# Patient Record
Sex: FEMALE | Race: WHITE | NOT HISPANIC OR LATINO | Employment: OTHER | URBAN - METROPOLITAN AREA
[De-identification: names, ages, dates, MRNs, and addresses within clinical notes are randomized per-mention and may not be internally consistent; named-entity substitution may affect disease eponyms.]

---

## 2020-08-25 ENCOUNTER — OFFICE VISIT (OUTPATIENT)
Dept: URGENT CARE | Facility: MEDICAL CENTER | Age: 40
End: 2020-08-25
Payer: COMMERCIAL

## 2020-08-25 VITALS
HEART RATE: 76 BPM | OXYGEN SATURATION: 100 % | DIASTOLIC BLOOD PRESSURE: 84 MMHG | BODY MASS INDEX: 23.6 KG/M2 | WEIGHT: 125 LBS | TEMPERATURE: 100 F | RESPIRATION RATE: 18 BRPM | HEIGHT: 61 IN | SYSTOLIC BLOOD PRESSURE: 161 MMHG

## 2020-08-25 DIAGNOSIS — S61.215A LACERATION OF LEFT RING FINGER WITHOUT FOREIGN BODY WITHOUT DAMAGE TO NAIL, INITIAL ENCOUNTER: Primary | ICD-10-CM

## 2020-08-25 PROCEDURE — G0382 LEV 3 HOSP TYPE B ED VISIT: HCPCS | Performed by: PHYSICIAN ASSISTANT

## 2020-08-26 ENCOUNTER — OFFICE VISIT (OUTPATIENT)
Dept: URGENT CARE | Facility: MEDICAL CENTER | Age: 40
End: 2020-08-26
Payer: COMMERCIAL

## 2020-08-26 VITALS
BODY MASS INDEX: 23.6 KG/M2 | DIASTOLIC BLOOD PRESSURE: 79 MMHG | HEIGHT: 61 IN | HEART RATE: 63 BPM | WEIGHT: 125 LBS | OXYGEN SATURATION: 100 % | TEMPERATURE: 98.7 F | SYSTOLIC BLOOD PRESSURE: 161 MMHG | RESPIRATION RATE: 18 BRPM

## 2020-08-26 DIAGNOSIS — S61.209D AVULSION OF SKIN OF FINGER, SUBSEQUENT ENCOUNTER: Primary | ICD-10-CM

## 2020-08-26 PROCEDURE — G0382 LEV 3 HOSP TYPE B ED VISIT: HCPCS | Performed by: PHYSICIAN ASSISTANT

## 2020-08-26 NOTE — PROGRESS NOTES
[facility]        NAME: Olive Macias is a 47 y o  female  : 1980    MRN: [id number]  DATE: 2020  TIME: 9:42 PM    Assessment and Plan   Laceration of left ring finger without foreign body without damage to nail, initial encounter [S61 215A]  1  Laceration of left ring finger without foreign body without damage to nail, initial encounter       Wound unable to be sutured as she has cut off some of her finger pad  1 mL of lidocaine with epinephrine injected to help stop bleeding and surgical foam applied with bleeding being stopped  She will return in 24 hours for surgical foam removal   Recommended Tylenol or Motrin for pain and keeping dressing applied for 24 hours  Patient declined tetanus vaccination in office  Patient Instructions   Tylenol or Motrin for pain  Keep dressing applied and do not get wet  Return in 24 hours for surgical foam removal  Follow up with PCP in 3-5 days  Proceed to ER if symptoms worsen  Chief Complaint     Chief Complaint   Patient presents with    Finger Laceration     Right 4th digit laceration tonight  Pt declines Tdap  History of Present Illness       Patient is a 31-year-old female who presents today with right 4th digit laceration  Patient states she accidentally lacerated her finger with a mandolin slicer tonight  Is not up-to-date on tetanus but declines vaccinations and states she does not vaccinate  Denies any numbness or tingling  Review of Systems   Review of Systems   Constitutional: Negative for fever  Respiratory: Negative for shortness of breath  Cardiovascular: Negative for chest pain  Skin: Positive for wound  Current Medications     No current outpatient medications on file      Current Allergies     Allergies as of 2020 - Reviewed 2020   Allergen Reaction Noted    Sulfa antibiotics Hives 2015            The following portions of the patient's history were reviewed and updated as appropriate: allergies, current medications, past family history, past medical history, past social history, past surgical history and problem list      History reviewed  No pertinent past medical history  History reviewed  No pertinent surgical history  Family History   Problem Relation Age of Onset    Hypertension Mother     Depression Mother     Hypertension Father          Medications have been verified  Objective   /84   Pulse 76   Temp 100 °F (37 8 °C)   Resp 18   Ht 5' 1" (1 549 m)   Wt 56 7 kg (125 lb)   SpO2 100%   BMI 23 62 kg/m²        Physical Exam     Physical Exam  Constitutional:       Appearance: Normal appearance  Cardiovascular:      Rate and Rhythm: Normal rate and regular rhythm  Pulmonary:      Effort: Pulmonary effort is normal       Breath sounds: Normal breath sounds  Skin:     General: Skin is warm and dry  Findings: Wound present  Neurological:      Mental Status: She is alert

## 2020-08-27 ENCOUNTER — APPOINTMENT (OUTPATIENT)
Dept: RADIOLOGY | Facility: AMBULARY SURGERY CENTER | Age: 40
End: 2020-08-27
Attending: SURGERY
Payer: COMMERCIAL

## 2020-08-27 ENCOUNTER — OFFICE VISIT (OUTPATIENT)
Dept: OBGYN CLINIC | Facility: CLINIC | Age: 40
End: 2020-08-27
Payer: COMMERCIAL

## 2020-08-27 VITALS
WEIGHT: 127 LBS | HEART RATE: 56 BPM | SYSTOLIC BLOOD PRESSURE: 132 MMHG | DIASTOLIC BLOOD PRESSURE: 85 MMHG | HEIGHT: 61 IN | BODY MASS INDEX: 23.98 KG/M2

## 2020-08-27 DIAGNOSIS — S61.214A LACERATION OF RIGHT RING FINGER, FOREIGN BODY PRESENCE UNSPECIFIED, NAIL DAMAGE STATUS UNSPECIFIED, INITIAL ENCOUNTER: ICD-10-CM

## 2020-08-27 DIAGNOSIS — S61.214A LACERATION OF RIGHT RING FINGER, FOREIGN BODY PRESENCE UNSPECIFIED, NAIL DAMAGE STATUS UNSPECIFIED, INITIAL ENCOUNTER: Primary | ICD-10-CM

## 2020-08-27 PROCEDURE — 99203 OFFICE O/P NEW LOW 30 MIN: CPT | Performed by: SURGERY

## 2020-08-27 PROCEDURE — 73130 X-RAY EXAM OF HAND: CPT

## 2020-08-27 NOTE — PATIENT INSTRUCTIONS
No direct water  Change outer dressing/bandage daily but leave surgi foam/Gelfoam in place  Call hand specialist and wound care for follow-up/recheck tomorrow  Go to the nearest ER for evaluation if any fevers, redness, warmth, discharge, streaking redness, redness that is circumferential, bleeding, pain, signs of infection or other concerning symptoms

## 2020-08-27 NOTE — PROGRESS NOTES
Astrid PRESTON  Attending, Orthopaedic Surgery  Hand, Wrist, and Elbow Surgery  Formerly [facility]      ORTHOPAEDIC HAND, WRIST, AND ELBOW OFFICE  VISIT       ASSESSMENT/PLAN:      72-year-old female with superficial right ring finger laceration, date of injury 08/25/2020  Dressings were removed today, wound is very superficial and bleeding is controlled  No significant damage to the nail  Will initiate a Xeroform and finger sock dressing for the next 3 days, transition to just dry dressings after that  She may shower and wash her hands as normal but avoid soaking the finger  Avoid any Neosporin or peroxide  Will hold off on antibiotics for now, patient was advised that if she notices any signs of infection to contact us as soon as possible  Leave wound open to air for several hours per day if possible  Follow-up in 1 week for repeat evaluation    The patient verbalized understanding of exam findings and treatment plan  We engaged in the shared decision-making process and treatment options were discussed at length with the patient  Surgical and conservative management discussed today along with risks and benefits  Diagnoses and all orders for this visit:    Laceration of right ring finger, foreign body presence unspecified, nail damage status unspecified, initial encounter  -     XR hand 3+ vw right; Future        Follow Up:  Return in about 1 week (around 9/3/2020) for Recheck  To Do Next Visit:  Re-evaluation of current issue      ____________________________________________________________________________________________________________________________________________      CHIEF COMPLAINT:  Chief Complaint   Patient presents with    Right Hand - Pain       SUBJECTIVE:  Marley Rivas is a 47y o  year old RHD female who presents for evaluation of a right ring finger injury that occurred on 08/25/2020    Patient states she was making duty need chips when she caught her finger in the [facility]  She presented to Urgent Care where the wound was dressed with Surgifoam and she was sent home  She returned the next day for continued bleeding and more foam was placed  She denies any fevers or chills or numbness and tingling  She does have some pain in that finger but is otherwise doing well  She was not provided with antibiotics at the time  Pain/symptom timing:  Worse during the day when active  Pain/symptom context:  Worse with activites and work  Pain/symptom modifying factors:  Rest makes better, activities make worse  Pain/symptom associated signs/symptoms: none    Prior treatment   · NSAIDsYes   · Injections No   · Bracing/Orthotics No    Physical Therapy Not Asked     I have personally reviewed all the relevant PMH, PSH, SH, FH, Medications and allergies      PAST MEDICAL HISTORY:  History reviewed  No pertinent past medical history  PAST SURGICAL HISTORY:  History reviewed  No pertinent surgical history  FAMILY HISTORY:  Family History   Problem Relation Age of Onset   Kari Gleason Hypertension Mother     Depression Mother     Hypertension Father        SOCIAL HISTORY:  Social History     Tobacco Use    Smoking status: Never Smoker    Smokeless tobacco: Never Used   Substance Use Topics    Alcohol use: Not on file    Drug use: Not on file       MEDICATIONS:  No current outpatient medications on file  ALLERGIES:  Allergies   Allergen Reactions    Sulfa Antibiotics Hives           REVIEW OF SYSTEMS:  Review of Systems   Constitutional: Negative for chills, diaphoresis, fatigue and fever  HENT: Negative for congestion, facial swelling, hearing loss, sore throat, trouble swallowing and voice change  Respiratory: Negative for apnea, cough, shortness of breath, wheezing and stridor  Cardiovascular: Negative for chest pain, palpitations and leg swelling  Gastrointestinal: Negative for abdominal pain, constipation, nausea and vomiting     Endocrine: Negative for cold intolerance and heat intolerance  Musculoskeletal: Positive for arthralgias  Negative for gait problem, joint swelling and myalgias  Skin: Positive for wound  Negative for color change, pallor and rash  Neurological: Negative for tremors, speech difficulty, weakness and numbness  Psychiatric/Behavioral: Negative for agitation, confusion, decreased concentration and hallucinations  The patient is not nervous/anxious  VITALS:  Vitals:    08/27/20 1010   BP: 132/85   Pulse: 56       LABS:  HgA1c: No results found for: HGBA1C  BMP: No results found for: GLUCOSE, CALCIUM, NA, K, CO2, CL, BUN, CREATININE    _____________________________________________________  PHYSICAL EXAMINATION:  General: well developed and well nourished, alert, oriented times 3 and appears comfortable  Psychiatric: Normal  HEENT: Normocephalic, Atraumatic Trachea Midline, No torticollis  Pulmonary: No audible wheezing or respiratory distress   Cardiovascular: No pitting edema, 2+ radial pulse   Skin: See below  Neurovascular: Sensation Intact to the Median, Ulnar, Radial Nerve, Motor Intact to the Median, Ulnar, Radial Nerve and Pulses Intact  Musculoskeletal: Normal, except as noted in detailed exam and in HPI        MUSCULOSKELETAL EXAMINATION:  Right ring finger:  Small circular fingertip laceration on the radial aspect of the ring finger near the nail  No significant damage to nail  No erythema or signs of infection  Sensitive and tender to palpation  Range-of-motion full    ___________________________________________________  STUDIES REVIEWED:  I have personally reviewed AP lateral and oblique radiographs of right hand   which demonstrate no acute fracture or dislocation, no bony disruption in the area of the laceration           PROCEDURES PERFORMED:  Procedures  No Procedures performed today    _____________________________________________________      Scribe Attestation    I,:   Hillary Bella PA-C am acting as a scribe while in the presence of the attending physician :        I,:   Angie Orellana MD personally performed the services described in this documentation    as scribed in my presence :

## 2020-08-27 NOTE — PROGRESS NOTES
[facility]        NAME: Cesar Moreland is a 47 y o  female  : 1980    MRN: [id number]  DATE: 2020  TIME: 10:29 PM    Assessment and Plan   Avulsion of skin of finger, subsequent encounter [G43 158R]  1  Avulsion of skin of finger, subsequent encounter  Ambulatory referral to Wound Care    Ambulatory referral to Hand Surgery         Patient Instructions      No direct water  Change outer dressing/bandage daily but leave surgi foam/Gelfoam in place  Call hand specialist and wound care for follow-up/recheck tomorrow  Go to the nearest ER for evaluation if any fevers, redness, warmth, discharge, streaking redness, redness that is circumferential, bleeding, pain, signs of infection or other concerning symptoms  Chief Complaint     Chief Complaint   Patient presents with    Wound Check     Pt seen here for laceration of left ring finger tip yesterday  History of Present Illness       42-year-old female presents for wound check and gelfoam/surgi foam removal after injury that occurred yesterday  Patient states she was using them insulin when she accidentally sliced her right 4th finger  She denies any nail involvement  States she cleaned it really well and came here as of bleeding would not stop  States they applied Gelfoam/surgery foam and a pressure dressing and told her to come back today for a wound check and to removed the Gel-Foam   Patient states she has not removed any of the dressings  States she has not needed anything for the pain  She denies any fevers, redness, warmth or other signs infection but does note some bleeding restarted and she thinks the Gel-Foam may have moved  She denies any numbness, tingling, weakness or other complaints  She states tetanus is up-to-date  Review of Systems   Review of Systems   Constitutional: Negative for activity change, appetite change, chills, fatigue and fever  HENT: Negative      Respiratory: Negative for shortness of breath  Cardiovascular: Negative for chest pain  Gastrointestinal: Negative for abdominal pain, diarrhea, nausea and vomiting  Musculoskeletal: Negative for arthralgias, back pain, joint swelling and neck pain  Skin: Positive for wound  Neurological: Negative for dizziness, weakness, numbness and headaches  All other systems reviewed and are negative  Current Medications     No current outpatient medications on file  Current Allergies     Allergies as of 08/26/2020 - Reviewed 08/26/2020   Allergen Reaction Noted    Sulfa antibiotics Hives 03/26/2015            The following portions of the patient's history were reviewed and updated as appropriate: allergies, current medications, past family history, past medical history, past social history, past surgical history and problem list      History reviewed  No pertinent past medical history  History reviewed  No pertinent surgical history  Family History   Problem Relation Age of Onset    Hypertension Mother     Depression Mother     Hypertension Father          Medications have been verified  Objective   /79   Pulse 63   Temp 98 7 °F (37 1 °C) (Temporal)   Resp 18   Ht 5' 1" (1 549 m)   Wt 56 7 kg (125 lb)   SpO2 100%   BMI 23 62 kg/m²        Physical Exam     Physical Exam  Vitals signs and nursing note reviewed  Constitutional:       General: She is not in acute distress  Appearance: She is well-developed  Cardiovascular:      Rate and Rhythm: Normal rate and regular rhythm  Heart sounds: Normal heart sounds  Pulmonary:      Effort: Pulmonary effort is normal       Breath sounds: Normal breath sounds  Musculoskeletal:      Right hand: She exhibits normal range of motion, no tenderness, normal capillary refill and no swelling  Normal sensation noted  Normal strength noted  Hands:       Comments: 5/5  strength  DIP/PIP flexion tendons intact  Full extension of the fingers  Gauze is in place over the 4th right finger  Patient states she noticed some bleeding and dried blood on the gauze   Skin:     Capillary Refill: Capillary refill takes less than 2 seconds  Neurological:      Mental Status: She is alert and oriented to person, place, and time  Sensory: No sensory deficit  Deep Tendon Reflexes: Reflexes are normal and symmetric  Comments: NV intact with sensation and strong peripheral pulses  5/5 strength of UE bilaterally   Psychiatric:         Behavior: Behavior normal        Explained to the patient that we do not want to remove the Gel-Foam at this time as that is what clots to help stop the bleeding  Patient states she believes he Gelfoam has moved as she noticed some bleeding  Outer dressing/gauze was removed  The surgi foam/gel foam had moved per the patient  The superficial skin avulsion was visualized on the distal right 4th finger with scant bleeding  No sign of infection  No obvious nail involvement however there was a surgi foam over the nail which patient believes moved and did not want removed at this time  New Gelfoam/surgery foam was applied to the visible superficial skin avulsion  Bleeding was controlled  No further bleeding  Non adherent and pressure dressing was applied  Patient tolerated well    Neurovascularly intact postprocedure

## 2021-03-16 ENCOUNTER — TELEPHONE (OUTPATIENT)
Dept: DERMATOLOGY | Facility: CLINIC | Age: 41
End: 2021-03-16

## 2021-03-18 ENCOUNTER — OFFICE VISIT (OUTPATIENT)
Dept: DERMATOLOGY | Facility: CLINIC | Age: 41
End: 2021-03-18
Payer: COMMERCIAL

## 2021-03-18 VITALS — TEMPERATURE: 98.1 F | WEIGHT: 135 LBS | HEIGHT: 61 IN | BODY MASS INDEX: 25.49 KG/M2

## 2021-03-18 DIAGNOSIS — L21.9 SEBORRHEIC DERMATITIS: ICD-10-CM

## 2021-03-18 DIAGNOSIS — L72.0 MILIUM: Primary | ICD-10-CM

## 2021-03-18 PROCEDURE — 99204 OFFICE O/P NEW MOD 45 MIN: CPT | Performed by: DERMATOLOGY

## 2021-03-18 RX ORDER — KETOCONAZOLE 20 MG/ML
SHAMPOO TOPICAL
Qty: 120 ML | Refills: 11 | Status: SHIPPED | OUTPATIENT
Start: 2021-03-18

## 2021-03-18 NOTE — PATIENT INSTRUCTIONS
Assessment and Plan:  Based on a thorough discussion of this condition and the management approach to it (including a comprehensive discussion of the known risks, side effects and potential benefits of treatment), the patient (family) agrees to implement the following specific plan:   Extraction attempted today with needle and q-tip (no charge)    Assessment and Plan  A milium is a small cyst containing keratin (the skin protein); they are usually multiple and are then known as milia  These harmless cysts present as tiny pearly-white bumps just under the surface of the skin  Milia are common in all ages and both sexes  They most often arise on the face and are particularly prominent on the eyelids and cheeks, but they may occur elsewhere  There are various kinds of milia    milia: Affect 40-50% of  babies, few to numerous lesions, often seen on the nose, but may also arise inside the mouth on the mucosa (Mansi pearls) or palate (Michael nodules) or more widely on the scalp, face and upper trunk, Heal spontaneously within a few weeks of birth   Primary milia in children and adults: found around eyelids, cheeks, forehead and genitalia, in young children, a row of milia may appear along the nasal crease, may clear in a few weeks or persist for months or longer   Juvenile milia: associated with Rombo syndrome, basal cell naevus syndrome, Fnxzf-Xatsv-Ymnalggi syndrome, pachyonychia congenita, Wayne syndrome and other genetic disorders, may be congenital (present at birth) or appear later in life   Milia en plaque: multiple milia appear on within an inflamed plaque up to several centimeters in diameter, usually found on an eyelid, behind the ear, on a cheek or jaw  3  Affect children and adults, especially middle-aged women  4  Sometimes associated with another skin disease including pseudoxanthoma elasticum, discoid lupus erythematosus, lichen planus     Multiple eruptive milia: crops of numerous milia appear over a few weeks to months, lesions may be asymptomatic or itchy, most often affect the face, upper arms and upper trunk   Traumatic milia: occur at the site of injury as skin heals, arise from eccrine sweat ducts, examples include thermal burns, dermabrasion, blistering rashes such as bullous pemphigoid, often seen on the back of hands and fingers in porphyria cutanea tarda, a milia-like calcified nodule may develop after  heel stick blood test     Milia associated with drugs: may rarely follow the use of topical medication, such as phenols, hydroquinone, 5-fluorouracil cream, and a corticosteroid  Milia have a characteristic appearance  However, on occasion, a skin biopsy may be performed  This shows a small epidermoid cyst coming from a vellus hair follicle  Milia should be distinguished from other types of cyst, comedones, xanthelasma and syringomas  Colloid milia are salgado coloured bumps on cheeks and temples associated with excessive exposure to sunlight  They should also be distinguished from milia-like cysts noted on dermoscopy in seborrhoeic keratoses, papillomatous moles and some basal cell carcinomas  Milia do not need to be treated unless they are a cause for concern for the patient  They often clear up by themselves within a few months  Where possible, further trauma should be minimised to reduce the development of new lesions   The lesion may be de-roofed using a sterile needle or blade and the contents squeezed or pricked out   They may be destroyed using diathermy and curettage, or cryotherapy   For widespread lesions, topical retinoids may be helpful   Chemical peels, dermabrasion and laser ablation have been reported to be effective when used for very extensive milia   Milia en plaque may improve with minocycline (a tetracycline antibiotic)      Assessment and Plan:  Based on a thorough discussion of this condition and the management approach to it (including a comprehensive discussion of the known risks, side effects and potential benefits of treatment), the patient (family) agrees to implement the following specific plan:  Start Ketoconazole shampoo 2% for at least 2-3 times a week, lather for 5 minutes prior to rinsing off  Can also use over the counter shampoos such as head and shoulder clinical strength or Dove anti-dandruff shampoo, Selsun blue, Neutrogena T sal and T gel  May need to use a combination of shampoos, can alternate them        Seborrheic Dermatitis   Seborrheic dermatitis is a common, chronic or relapsing form of eczema/dermatitis that mainly affects the sebaceous, gland-rich regions of the scalp, face, and trunk  There are infantile and adult forms of seborrhoeic dermatitis  It is sometimes associated with psoriasis and, in that clinical scenario, may be referred to as "sebo-psoriasis "  Seborrheic dermatitis is also known as "seborrheic eczema "  Dandruff (also called "pityriasis capitis") is an uninflamed form of seborrhoeic dermatitis  Dandruff presents as bran-like scaly patches scattered within hair-bearing areas of the scalp  In an infant, this condition may be referred to as "cradle cap "  The cause of seborrheic dermatitis is not completely understood  It is associated with proliferation of various species of the skin commensal Malassezia, in its yeast (non-pathogenic) form  Its metabolites (such as the fatty acids oleic acid, malssezin, and indole-3-carbaldehyde) may cause an inflammatory reaction  Differences in skin barrier lipid content and function may account for individual presentations  Infantile Seborrheic Dermatitis  Infantile seborrheic dermatitis affects babies under the age of 1 months and usually resolves by 1012 months of age  Infantile seborrheic dermatitis causes "cradle cap" (diffuse, greasy scaling on scalp)  The rash may spread to affect armpit and groin folds (a type of "napkin dermatitis")    There may be associated salmon-pink colored patches that may flake or peel  The rash in this case is usually not especially itchy, so the baby often appears undisturbed by the rash, even when more generalized  Adult Seborrheic Dermatitis  Adult seborrheic dermatitis tends to begin in late adolescence; prevalence is greatest in young adults and in the elderly  It is more common in males than in females  The following factors are sometimes associated with severe adult seborrheic dermatitis:   Oily skin   Familial tendency to seborrhoeic dermatitis or a family history of psoriasis   Immunosuppression: organ transplant recipient, human immunodeficiency virus (HIV) infection and patients with lymphoma   Neurological and psychiatric diseases: Parkinson disease, tardive dyskinesia, depression, epilepsy, facial nerve palsy, spinal cord injury and congenital disorders such as Down syndrome   Treatment for psoriasis with psoralen and ultraviolet A (PUVA) therapy   Lack of sleep   Stressful events  In adults, seborrheic dermatitis may typically affect the scalp, face (creases around the nose, behind ears, within eyebrows) and upper trunk  Typical clinical features include:   Winter flares, improving in summer following sun exposure   Minimal itch most of the time   Combination oily and dry mid-facial skin   Ill-defined localized scaly patches or diffuse scale in the scalp   Blepharitis; scaly red eyelid margins   McLean-pink, thin, scaly, and ill-defined plaques in skin folds on both sides of the face   Petal or ring-shaped flaky patches on hair-line and on anterior chest   Rash in armpits, under the breasts, in the groin folds and genital creases   Superficial folliculitis (inflamed hair follicles) on cheeks and upper trunk    Seborrheic dermatitis is diagnosed by its clinical appearance and behavior  Skin biopsy may be helpful but is rarely necessary to make this diagnosis

## 2021-03-18 NOTE — PROGRESS NOTES
[facility] Note     Patient Name: Edwina Causey  Encounter Date: 03/18/2021     Have you been cared for by a [facility] Dermatologist in the last 3 years and, if so, which one? No    · Have you traveled outside of the [address] [place] [address] in the past 3 months or outside of the [facility] area in the last 2 weeks? No     May we call your Preferred Phone number to discuss your specific medical information? Yes     May we leave a detailed message that includes your specific medical information? Yes      Today's Chief Concerns:   Concern #1:  Lesion on right side of neck    Concern #2:  Dry scalp     Past Medical History:  Have you personally ever had or currently have any of the following? · Skin cancer (such as Melanoma, Basal Cell Carcinoma, Squamous Cell Carcinoma? (If Yes, please provide more detail)- No  · Eczema: No  · Psoriasis: No  · HIV/AIDS: No  · Hepatitis B or C: No  · Tuberculosis: No  · Systemic Immunosuppression such as Diabetes, Biologic or Immunotherapy, Chemotherapy, Organ Transplantation, Bone Marrow Transplantation (If YES, please provide more detail): No  · Radiation Treatment (If YES, please provide more detail): No  · Any other major medical conditions/concerns? (If Yes, which types)- No    Social History:     What is/was your primary occupation?  What are your hobbies/past-times? Family History:  Have any of your "first degree relatives" (parent, brother, sister, or child) had any of the following       · Skin cancer such as Melanoma or Merkel Cell Carcinoma or Pancreatic Cancer? No  · Eczema, Asthma, Hay Fever or Seasonal Allergies: No  · Psoriasis or Psoriatic Arthritis: No  · Do any other medical conditions seem to run in your family? If Yes, what condition and which relatives? No    Current Medications:       No current outpatient medications on file        Review of Systems:  Have you recently had or currently have any of the following? If YES, what are you doing for the problem? · Fever, chills or unintended weight loss: No  · Sudden loss or change in your vision: No  · Nausea, vomiting or blood in your stool: No  · Painful or swollen joints: No  · Wheezing or cough: No  · Changing mole or non-healing wound: No  · Nosebleeds: No  · Excessive sweating: No  · Easy or prolonged bleeding? No  · Over the last 2 weeks, how often have you been bothered by the following problems? · Taking little interest or pleasure in doing things: 1 - Not at All  · Feeling down, depressed, or hopeless: 1 - Not at All  · Rapid heartbeat with epinephrine:  No    · FEMALES ONLY:    · Are you pregnant or planning to become pregnant? No  · Are you currently or planning to be nursing or breast feeding? No    · Any known allergies? Allergies   Allergen Reactions    Sulfa Antibiotics Hives   ·       Physical Exam:     Was a chaperone (Derm Clinical Assistant) present throughout the entire Physical Exam? Yes     Did the Dermatology Team specifically  the patient on the importance of a Full Skin Exam to be sure that nothing is missed clinically?  Yes}  o Did the patient ultimately request or accept a Full Skin Exam?  NO  o Did the patient specifically refuse to have the areas "under-the-bra" examined by the Dermatologist? Miracle morales Did the patient specifically refuse to have the areas "under-the-underwear" examined by the Dermatologist? YES    CONSTITUTIONAL:   Vitals:    03/18/21 1331   Temp: 98 1 °F (36 7 °C)   TempSrc: Tympanic   Weight: 61 2 kg (135 lb)   Height: 5' 1" (1 549 m)       PSYCH: Normal mood and affect  EYES: Normal conjunctiva  ENT: Normal lips and oral mucosa  CARDIOVASCULAR: No edema  RESPIRATORY: Normal respirations  HEME/LYMPH/IMMUNO:  No regional lymphadenopathy except as noted below in "ASSESSMENT AND PLAN BY DIAGNOSIS"    SKIN:  FULL ORGAN SYSTEM EXAM  Hair, Scalp, Ears, Face Normal except as noted below in Assessment   Neck, Cervical Chain Nodes Normal except as noted below in Assessment   Right Arm/Hand/Fingers Normal except as noted below in Assessment   Left Arm/Hand/Fingers Normal except as noted below in Assessment   Chest/Breasts/Axillae    Abdomen, Umbilicus    Back/Spine    Groin/Genitalia/Buttocks    Right Leg, Foot, Toes    Left Leg, Foot, Toes         Assessment and Plan by Diagnosis:    History of Present Condition:     Duration:  How long has this been an issue for you?    o  neck- 2 years  o Scalp- years    Location Affected:  Where on the body is this affecting you?    o  neck and scalp    Quality:  Is there any bleeding, pain, itch, burning/irritation, or redness associated with the skin lesion? o  neck- denies   o Scalp- denies    Severity:  Describe any bleeding, pain, itch, burning/irritation, or redness on a scale of 1 to 10 (with 10 being the worst)  o  denies    Timing:  Does this condition seem to be there pretty constantly or do you notice it more at specific times throughout the day? o  constant    Context:  Have you ever noticed that this condition seems to be associated with specific activities you do?    o  denies    Modifying Factors:    o Anything that seems to make the condition worse?    -  denies   o What have you tried to do to make the condition better?    -  neck- denies  - Scalp- shampoos, water with apple cider vinegar, tea tree oil    Associated Signs and Symptoms:  Does this skin lesion seem to be associated with any of the following:  o  denies     1   MILIUM     Physical Exam:   Anatomic Location Affected:  Right neck    Morphological Description:  White papule    Pertinent Positives:   Pertinent Negatives:      Assessment and Plan:  Based on a thorough discussion of this condition and the management approach to it (including a comprehensive discussion of the known risks, side effects and potential benefits of treatment), the patient (family) agrees to implement the following specific plan:   Extraction attempted today with needle and q-tip (no charge)    Assessment and Plan  A milium is a small cyst containing keratin (the skin protein); they are usually multiple and are then known as milia  These harmless cysts present as tiny pearly-white bumps just under the surface of the skin  Milia are common in all ages and both sexes  They most often arise on the face and are particularly prominent on the eyelids and cheeks, but they may occur elsewhere  There are various kinds of milia    milia: Affect 40-50% of  babies, few to numerous lesions, often seen on the nose, but may also arise inside the mouth on the mucosa (Mansi pearls) or palate (Michael nodules) or more widely on the scalp, face and upper trunk, Heal spontaneously within a few weeks of birth   Primary milia in children and adults: found around eyelids, cheeks, forehead and genitalia, in young children, a row of milia may appear along the nasal crease, may clear in a few weeks or persist for months or longer   Juvenile milia: associated with Rombo syndrome, basal cell naevus syndrome, Metfk-Gndpz-Tkwbxdnd syndrome, pachyonychia congenita, Wayne syndrome and other genetic disorders, may be congenital (present at birth) or appear later in life   Milia en plaque: multiple milia appear on within an inflamed plaque up to several centimeters in diameter, usually found on an eyelid, behind the ear, on a cheek or jaw  3  Affect children and adults, especially middle-aged women  4  Sometimes associated with another skin disease including pseudoxanthoma elasticum, discoid lupus erythematosus, lichen planus   Multiple eruptive milia: crops of numerous milia appear over a few weeks to months, lesions may be asymptomatic or itchy, most often affect the face, upper arms and upper trunk     Traumatic milia: occur at the site of injury as skin heals, arise from eccrine sweat ducts, examples include thermal burns, dermabrasion, blistering rashes such as bullous pemphigoid, often seen on the back of hands and fingers in porphyria cutanea tarda, a milia-like calcified nodule may develop after  heel stick blood test     Milia associated with drugs: may rarely follow the use of topical medication, such as phenols, hydroquinone, 5-fluorouracil cream, and a corticosteroid  Milia have a characteristic appearance  However, on occasion, a skin biopsy may be performed  This shows a small epidermoid cyst coming from a vellus hair follicle  Milia should be distinguished from other types of cyst, comedones, xanthelasma and syringomas  Colloid milia are salgado coloured bumps on cheeks and temples associated with excessive exposure to sunlight  They should also be distinguished from milia-like cysts noted on dermoscopy in seborrhoeic keratoses, papillomatous moles and some basal cell carcinomas  Milia do not need to be treated unless they are a cause for concern for the patient  They often clear up by themselves within a few months  Where possible, further trauma should be minimised to reduce the development of new lesions   The lesion may be de-roofed using a sterile needle or blade and the contents squeezed or pricked out   They may be destroyed using diathermy and curettage, or cryotherapy   For widespread lesions, topical retinoids may be helpful   Chemical peels, dermabrasion and laser ablation have been reported to be effective when used for very extensive milia   Milia en plaque may improve with minocycline (a tetracycline antibiotic)      2  SEBORRHEIC DERMATITIS    Physical Exam:   Anatomic Location Affected:  Scalp    Morphological Description:  Scale    Pertinent Positives:   Pertinent Negatives:    Assessment and Plan:  Based on a thorough discussion of this condition and the management approach to it (including a comprehensive discussion of the known risks, side effects and potential benefits of treatment), the patient (family) agrees to implement the following specific plan:  Start Ketoconazole shampoo 2% for at least 2-3 times a week, lather for 5 minutes prior to rinsing off  Can also use over the counter shampoos such as head and shoulder clinical strength or Dove anti-dandruff shampoo, Selsun blue, Neutrogena T sal and T gel  May need to use a combination of shampoos, can alternate them        Seborrheic Dermatitis   Seborrheic dermatitis is a common, chronic or relapsing form of eczema/dermatitis that mainly affects the sebaceous, gland-rich regions of the scalp, face, and trunk  There are infantile and adult forms of seborrhoeic dermatitis  It is sometimes associated with psoriasis and, in that clinical scenario, may be referred to as "sebo-psoriasis "  Seborrheic dermatitis is also known as "seborrheic eczema "  Dandruff (also called "pityriasis capitis") is an uninflamed form of seborrhoeic dermatitis  Dandruff presents as bran-like scaly patches scattered within hair-bearing areas of the scalp  In an infant, this condition may be referred to as "cradle cap "  The cause of seborrheic dermatitis is not completely understood  It is associated with proliferation of various species of the skin commensal Malassezia, in its yeast (non-pathogenic) form  Its metabolites (such as the fatty acids oleic acid, malssezin, and indole-3-carbaldehyde) may cause an inflammatory reaction  Differences in skin barrier lipid content and function may account for individual presentations  Infantile Seborrheic Dermatitis  Infantile seborrheic dermatitis affects babies under the age of 1 months and usually resolves by 1012 months of age  Infantile seborrheic dermatitis causes "cradle cap" (diffuse, greasy scaling on scalp)  The rash may spread to affect armpit and groin folds (a type of "napkin dermatitis")  There may be associated salmon-pink colored patches that may flake or peel    The rash in this case is usually not especially itchy, so the baby often appears undisturbed by the rash, even when more generalized  Adult Seborrheic Dermatitis  Adult seborrheic dermatitis tends to begin in late adolescence; prevalence is greatest in young adults and in the elderly  It is more common in males than in females  The following factors are sometimes associated with severe adult seborrheic dermatitis:   Oily skin   Familial tendency to seborrhoeic dermatitis or a family history of psoriasis   Immunosuppression: organ transplant recipient, human immunodeficiency virus (HIV) infection and patients with lymphoma   Neurological and psychiatric diseases: Parkinson disease, tardive dyskinesia, depression, epilepsy, facial nerve palsy, spinal cord injury and congenital disorders such as Down syndrome   Treatment for psoriasis with psoralen and ultraviolet A (PUVA) therapy   Lack of sleep   Stressful events  In adults, seborrheic dermatitis may typically affect the scalp, face (creases around the nose, behind ears, within eyebrows) and upper trunk  Typical clinical features include:   Winter flares, improving in summer following sun exposure   Minimal itch most of the time   Combination oily and dry mid-facial skin   Ill-defined localized scaly patches or diffuse scale in the scalp   Blepharitis; scaly red eyelid margins   Cincinnatus-pink, thin, scaly, and ill-defined plaques in skin folds on both sides of the face   Petal or ring-shaped flaky patches on hair-line and on anterior chest   Rash in armpits, under the breasts, in the groin folds and genital creases   Superficial folliculitis (inflamed hair follicles) on cheeks and upper trunk    Seborrheic dermatitis is diagnosed by its clinical appearance and behavior  Skin biopsy may be helpful but is rarely necessary to make this diagnosis      Scribe Attestation    I,:  Eric Stoll MA am acting as a scribe while in the presence of the attending physician :       I,: Alfonso Galindo MD personally performed the services described in this documentation    as scribed in my presence :

## 2022-12-29 ENCOUNTER — OFFICE VISIT (OUTPATIENT)
Dept: DERMATOLOGY | Facility: CLINIC | Age: 42
End: 2022-12-29

## 2022-12-29 VITALS — HEIGHT: 61 IN | BODY MASS INDEX: 25.49 KG/M2 | WEIGHT: 135 LBS | TEMPERATURE: 97 F

## 2022-12-29 DIAGNOSIS — D22.9 MULTIPLE MELANOCYTIC NEVI: Primary | ICD-10-CM

## 2022-12-29 DIAGNOSIS — D18.01 CHERRY ANGIOMA: ICD-10-CM

## 2022-12-29 DIAGNOSIS — L85.3 XEROSIS OF SKIN: ICD-10-CM

## 2022-12-29 DIAGNOSIS — L91.8 ACHROCHORDON: ICD-10-CM

## 2022-12-29 DIAGNOSIS — L81.4 LENTIGO: ICD-10-CM

## 2022-12-29 DIAGNOSIS — L72.0 MILIUM: ICD-10-CM

## 2022-12-29 DIAGNOSIS — L82.1 SEBORRHEIC KERATOSIS: ICD-10-CM

## 2022-12-29 NOTE — PATIENT INSTRUCTIONS
MELANOCYTIC NEVI ("Moles")    Assessment and Plan:  Based on a thorough discussion of this condition and the management approach to it (including a comprehensive discussion of the known risks, side effects and potential benefits of treatment), the patient (family) agrees to implement the following specific plan:  When outside we recommend using a wide brim hat, sunglasses, long sleeve and pants, sunscreen with SPF 32+ with reapplication every 2 hours, or SPF specific clothing   Benign, reassured  Annual skin check     Melanocytic Nevi  Melanocytic nevi ("moles") are tan or brown, raised or flat areas of the skin which have an increased number of melanocytes  Melanocytes are the cells in our body which make pigment and account for skin color  Some moles are present at birth (I e , "congenital nevi"), while others come up later in life (i e , "acquired nevi")  The sun can stimulate the body to make more moles  Sunburns are not the only thing that triggers more moles  Chronic sun exposure can do it too  Clinically distinguishing a healthy mole from melanoma may be difficult, even for experienced dermatologists  The "ABCDE's" of moles have been suggested as a means of helping to alert a person to a suspicious mole and the possible increased risk of melanoma  The suggestions for raising alert are as follows:    Asymmetry: Healthy moles tend to be symmetric, while melanomas are often asymmetric  Asymmetry means if you draw a line through the mole, the two halves do not match in color, size, shape, or surface texture  Asymmetry can be a result of rapid enlargement of a mole, the development of a raised area on a previously flat lesion, scaling, ulceration, bleeding or scabbing within the mole  Any mole that starts to demonstrate "asymmetry" should be examined promptly by a board certified dermatologist      Border: Healthy moles tend to have discrete, even borders    The border of a melanoma often blends into the normal skin and does not sharply delineate the mole from normal skin  Any mole that starts to demonstrate "uneven borders" should be examined promptly by a board certified dermatologist      Color: Healthy moles tend to be one color throughout  Melanomas tend to be made up of different colors ranging from dark black, blue, white, or red  Any mole that demonstrates a color change should be examined promptly by a board certified dermatologist      Diameter: Healthy moles tend to be smaller than 0 6 cm in size; an exception are "congenital nevi" that can be larger  Melanomas tend to grow and can often be greater than 0 6 cm (1/4 of an inch, or the size of a pencil eraser)  This is only a guideline, and many normal moles may be larger than 0 6 cm without being unhealthy  Any mole that starts to change in size (small to bigger or bigger to smaller) should be examined promptly by a board certified dermatologist      Evolving: Healthy moles tend to "stay the same "  Melanomas may often show signs of change or evolution such as a change in size, shape, color, or elevation  Any mole that starts to itch, bleed, crust, burn, hurt, or ulcerate or demonstrate a change or evolution should be examined promptly by a board certified dermatologist         Lamont Degree and Plan:  Based on a thorough discussion of this condition and the management approach to it (including a comprehensive discussion of the known risks, side effects and potential benefits of treatment), the patient (family) agrees to implement the following specific plan:  When outside we recommend using a wide brim hat, sunglasses, long sleeve and pants, sunscreen with SPF 14+ with reapplication every 2 hours, or SPF specific clothing       What is a lentigo? A lentigo is a pigmented flat or slightly raised lesion with a clearly defined edge  Unlike an ephelis (freckle), it does not fade in the winter months  There are several kinds of lentigo    The name lentigo originally referred to its appearance resembling a small lentil  The plural of lentigo is lentigines, although “lentigos” is also in common use  Who gets lentigines? Lentigines can affect males and females of all ages and races  Solar lentigines are especially prevalent in fair skinned adults  Lentigines associated with syndromes are present at birth or arise during childhood  What causes lentigines? Common forms of lentigo are due to exposure to ultraviolet radiation:  Sun damage including sunburn   Indoor tanning   Phototherapy, especially photochemotherapy (PUVA)    Ionizing radiation, eg radiation therapy, can also cause lentigines  Several familial syndromes associated with widespread lentigines originate from mutations in Aleksey-MAP kinase, mTOR signaling and PTEN pathways  What is the treatment for lentigines? Most lentigines are left alone  Attempts to lighten them may not be successful  The following approaches are used:  SPF 50+ broad-spectrum sunscreen   Hydroquinone bleaching cream   Alpha hydroxy acids   Vitamin C   Retinoids   Azelaic acid   Chemical peels  Individual lesions can be permanently removed using:  Cryotherapy   Intense pulsed light   Pigment lasers    How can lentigines be prevented? Lentigines associated with exposure ultraviolet radiation can be prevented by very careful sun protection  Clothing is more successful at preventing new lentigines than are sunscreens  What is the outlook for lentigines? Lentigines usually persist  They may increase in number with age and sun exposure  Some in sun-protected sites may fade and disappear      MERCADO ANGIOMAS    Assessment and Plan:  Based on a thorough discussion of this condition and the management approach to it (including a comprehensive discussion of the known risks, side effects and potential benefits of treatment), the patient (family) agrees to implement the following specific plan:  Monitor for changes  Benign, reassured      Assessment and Plan:    Cherry angioma, also known as Dinah Lather spots, are benign vascular skin lesions  A "cherry angioma" is a firm red, blue or purple papule, 0 1-1 cm in diameter  When thrombosed, they can appear black in colour until evaluated with a dermatoscope when the red or purple colour is more easily seen  Cherry angioma may develop on any part of the body but most often appear on the scalp, face, lips and trunk  An angioma is due to proliferating endothelial cells; these are the cells that line the inside of a blood vessel  Angiomas can arise in early life or later in life; the most common type of angioma is a cherry angioma  Cherry angiomas are very common in males and females of any age or race  They are more noticeable in white skin than in skin of colour  They markedly increase in number from about the age of 36  There may be a family history of similar lesions  Eruptive cherry angiomas have been rarely reported to be associated with internal malignancy  The cause of angiomas is unknown  Genetic analysis of cherry angiomas has shown that they frequently carry specific somatic missense mutations in the GNAQ and GNA11 (Q209H) genes, which are involved in other vascular and melanocytic proliferations  SEBORRHEIC KERATOSIS; NON-INFLAMED    Assessment and Plan:  Based on a thorough discussion of this condition and the management approach to it (including a comprehensive discussion of the known risks, side effects and potential benefits of treatment), the patient (family) agrees to implement the following specific plan:  Monitor for changes  Benign, reassured      Seborrheic Keratosis  A seborrheic keratosis is a harmless warty spot that appears during adult life as a common sign of skin aging  Seborrheic keratoses can arise on any area of skin, covered or uncovered, with the usual exception of the palms and soles  They do not arise from mucous membranes   Seborrheic keratoses can have highly variable appearance  Seborrheic keratoses are extremely common  It has been estimated that over 90% of adults over the age of 61 years have one or more of them  They occur in males and females of all races, typically beginning to erupt in the 35s or 45s  They are uncommon under the age of 21 years  The precise cause of seborrhoeic keratoses is not known  Seborrhoeic keratoses are considered degenerative in nature  As time goes by, seborrheic keratoses tend to become more numerous  Some people inherit a tendency to develop a very large number of them; some people may have hundreds of them  There is no easy way to remove multiple lesions on a single occasion  Unless a specific lesion is "inflamed" and is causing pain or stinging/burning or is bleeding, most insurance companies do not authorize treatment  XEROSIS ("DRY SKIN")    Assessment and Plan:  Based on a thorough discussion of this condition and the management approach to it (including a comprehensive discussion of the known risks, side effects and potential benefits of treatment), the patient (family) agrees to implement the following specific plan:  Use moisturizer like Eucerin,Cerave or Aveeno Cream 3 times a day for the dry skin            Dry skin refers to skin that feels dry to touch  Dry skin has a dull surface with a rough, scaly quality  The skin is less pliable and cracked  When dryness is severe, the skin may become inflamed and fissured  Although any body site can be dry, dry skin tends to affect the shins more than any other site  Dry skin is lacking moisture in the outer horny cell layer (stratum corneum) and this results in cracks in the skin surface  Dry skin is also called xerosis, xeroderma or asteatosis (lack of fat)  It can affect males and females of all ages  There is some racial variability in water and lipid content of the skin    Dry skin that starts in early childhood may be one of about 20 types of ichthyosis (fish-scale skin)  There is often a family history of dry skin  Dry skin is commonly seen in people with atopic dermatitis  Nearly everyone > 60 years has dry skin  Dry skin that begins later may be seen in people with certain diseases and conditions  Postmenopausal women  Hypothyroidism  Chronic renal disease   Malnutrition and weight loss   Subclinical dermatitis   Treatment with certain drugs such as oral retinoids, diuretics and epidermal growth factor receptor inhibitors      What is the treatment for dry skin? The mainstay of treatment of dry skin and ichthyosis is moisturisers/emollients  They should be applied liberally and often enough to:  Reduce itch   Improve the barrier function   Prevent entry of irritants, bacteria   Reduce transepidermal water loss  How can dry skin be prevented? Eliminate aggravating factors:  Reduce the frequency of bathing  A humidifier in winter and air conditioner in summer   Compare having a short shower with a prolonged soak in a bath  Use lukewarm, not hot, water  Replace standard soap with a substitute such as a synthetic detergent cleanser, water-miscible emollient, bath oil, anti-pruritic tar oil, colloidal oatmeal etc    Apply an emollient liberally and often, particularly shortly after bathing, and when itchy  The drier the skin, the thicker this should be, especially on the hands  What is the outlook for dry skin? A tendency to dry skin may persist life-long, or it may improve once contributing factors are controlled      MILIUM     Assessment and Plan:  Based on a thorough discussion of this condition and the management approach to it (including a comprehensive discussion of the known risks, side effects and potential benefits of treatment), the patient (family) agrees to implement the following specific plan:  Benign, assurance provided    Assessment and Plan  A milium is a small cyst containing keratin (the skin protein); they are usually multiple and are then known as milia  These harmless cysts present as tiny pearly-white bumps just under the surface of the skin  Milia are common in all ages and both sexes  They most often arise on the face and are particularly prominent on the eyelids and cheeks, but they may occur elsewhere  There are various kinds of milia   milia: Affect 40-50% of  babies, few to numerous lesions, often seen on the nose, but may also arise inside the mouth on the mucosa (Mansi pearls) or palate (Michael nodules) or more widely on the scalp, face and upper trunk, Heal spontaneously within a few weeks of birth  Primary milia in children and adults: found around eyelids, cheeks, forehead and genitalia, in young children, a row of milia may appear along the nasal crease, may clear in a few weeks or persist for months or longer  Juvenile milia: associated with Rombo syndrome, basal cell naevus syndrome, Iygux-Kpyzy-Wfefeaub syndrome, pachyonychia congenita, Wayne syndrome and other genetic disorders, may be congenital (present at birth) or appear later in life  Milia en plaque: multiple milia appear on within an inflamed plaque up to several centimeters in diameter, usually found on an eyelid, behind the ear, on a cheek or jaw  3  Affect children and adults, especially middle-aged women  4  Sometimes associated with another skin disease including pseudoxanthoma elasticum, discoid lupus erythematosus, lichen planus  Multiple eruptive milia: crops of numerous milia appear over a few weeks to months, lesions may be asymptomatic or itchy, most often affect the face, upper arms and upper trunk    Traumatic milia: occur at the site of injury as skin heals, arise from eccrine sweat ducts, examples include thermal burns, dermabrasion, blistering rashes such as bullous pemphigoid, often seen on the back of hands and fingers in porphyria cutanea tarda, a milia-like calcified nodule may develop after  heel stick blood test    Milia associated with drugs: may rarely follow the use of topical medication, such as phenols, hydroquinone, 5-fluorouracil cream, and a corticosteroid  Milia have a characteristic appearance  However, on occasion, a skin biopsy may be performed  This shows a small epidermoid cyst coming from a vellus hair follicle  Milia should be distinguished from other types of cyst, comedones, xanthelasma and syringomas  Colloid milia are salgado coloured bumps on cheeks and temples associated with excessive exposure to sunlight  They should also be distinguished from milia-like cysts noted on dermoscopy in seborrhoeic keratoses, papillomatous moles and some basal cell carcinomas  Milia do not need to be treated unless they are a cause for concern for the patient  They often clear up by themselves within a few months  Where possible, further trauma should be minimised to reduce the development of new lesions  The lesion may be de-roofed using a sterile needle or blade and the contents squeezed or pricked out  They may be destroyed using diathermy and curettage, or cryotherapy  For widespread lesions, topical retinoids may be helpful  Chemical peels, dermabrasion and laser ablation have been reported to be effective when used for very extensive milia  Milia en plaque may improve with minocycline (a tetracycline antibiotic)  ACROCHORDON ("SKIN TAG")    Assessment and Plan:  Based on a thorough discussion of this condition and the management approach to it (including a comprehensive discussion of the known risks, side effects and potential benefits of treatment), the patient (family) agrees to implement the following specific plan:  Patient will be scheduled for a shave biopsy removal      Skin tags are common, soft, harmless skin lesions that are also called, in the appropriate settings, papillomas, fibroepithelial polyps, and soft fibromas    They are made up of loosely arranged collagen fibers and blood vessels surrounded by a thickened or thinned-out epidermis  Skin tags tend to develop in both men and women as we grow older  They are usually found on the skin folds (neck, armpits, groin)  It is not known what specifically causes skin tags  Certain factors, though, do appear to play a role:  Chaffing and irritation from skin rubbing together  High levels of growth factors (as seen, for example, in pregnancy or in acromegaly/gigantism)  Insulin resistance  Human papillomavirus (wart virus)    We discussed that most skin tags do not need to be treated unless they are specifically causing the patient physical distress or limitation or pose a risk for a larger problem such as an infection that forms secondary to excoriation or chronic irritation      We had a thorough discussion of treatment options and specific risks (including that any procedural treatment may not be covered by insurance and would then be the patient's responsibility) and benefits/alternatives including but not limited to the following:  Cryotherapy (freezing)  Shave removal  Surgical excision (snip excision with scissors)  Electrosurgery  Ligation (we do not do this procedure and counseled against it due to risk of tissue necrosis and infection)

## 2024-05-14 ENCOUNTER — OFFICE VISIT (OUTPATIENT)
Dept: OBGYN CLINIC | Facility: CLINIC | Age: 44
End: 2024-05-14
Payer: COMMERCIAL

## 2024-05-14 ENCOUNTER — APPOINTMENT (OUTPATIENT)
Dept: RADIOLOGY | Facility: AMBULARY SURGERY CENTER | Age: 44
End: 2024-05-14
Attending: STUDENT IN AN ORGANIZED HEALTH CARE EDUCATION/TRAINING PROGRAM
Payer: COMMERCIAL

## 2024-05-14 VITALS
HEART RATE: 72 BPM | BODY MASS INDEX: 25.49 KG/M2 | WEIGHT: 135 LBS | DIASTOLIC BLOOD PRESSURE: 92 MMHG | SYSTOLIC BLOOD PRESSURE: 132 MMHG | HEIGHT: 61 IN

## 2024-05-14 DIAGNOSIS — M25.531 RIGHT WRIST PAIN: ICD-10-CM

## 2024-05-14 DIAGNOSIS — M25.531 WRIST PAIN, ACUTE, RIGHT: ICD-10-CM

## 2024-05-14 DIAGNOSIS — R22.31 MASS OF WRIST, RIGHT: Primary | ICD-10-CM

## 2024-05-14 PROCEDURE — 99204 OFFICE O/P NEW MOD 45 MIN: CPT | Performed by: STUDENT IN AN ORGANIZED HEALTH CARE EDUCATION/TRAINING PROGRAM

## 2024-05-14 PROCEDURE — 73130 X-RAY EXAM OF HAND: CPT

## 2024-05-14 NOTE — PROGRESS NOTES
ORTHOPAEDIC HAND, WRIST, AND ELBOW OFFICE  VISIT      ASSESSMENT/PLAN:      Diagnoses and all orders for this visit:    Mass of wrist, right  -     MRI wrist right wo contrast; Future  -     Diclofenac Sodium (VOLTAREN) 1 %; Apply 2 g topically 4 (four) times a day    Right wrist pain  -     XR wrist 3+ vw right; Future  -     XR hand 3+ vw right; Future  -     MRI wrist right wo contrast; Future          43 y.o. female with right wrist pain and possible dorsal ganglion cyst    I discussed with the patient that the mass to the dorsal aspect of her wrist is likely a ganglion cyst. The patient has chronic right wrist pain that has been ongoing for appx 20 years. I discussed with the patient the cyst may be the source of her pain however, I cannot guarantee this. She also has decreased motion on exam and is tender over the radiocarpal joint and the lunate. We discussed ordering a MRI to better evaluate the mass to to also evaluate the joint and any other source of pain. The patient was agreeable to this and a order was placed for this today. A prescription was also provided for Voltaren Gel. She will follow up once the MRI is complete to discuss the results.       Follow Up:  After testing       To Do Next Visit:  Re-evaluation of current issue        Aj Badillo MD  Attending, Orthopaedic Surgery  Hand, Wrist, and Elbow Surgery  [facility]    ______________________________________________________________________________________________    CHIEF COMPLAINT:  Chief Complaint   Patient presents with   • Right Hand - Pain   • Right Wrist - Pain       SUBJECTIVE:  Patient is a 43 y.o. RHD female who presents today for evaluation and treatment of right wrist pain. The patient states this has been ongoing appx 20 years ago and noticed it mainly with yoga. She states over the years she would occ get flare up and she would have to take time off of work. The patient states she did injury her wrist  "when she was appx 15 year old and did a roundoff and injured her wrist. She states she did not get it looked at then. The patient notes pain to the dorsal aspect of her wrist and recently developed a cyst to this area. She also notes occ volar ulnar sided wrist pain and pain into her thumb. She has tried multiple OTC medications and creams without relief.    Occupation: massage therapist      I have personally reviewed all the relevant PMH, PSH, SH, FH, Medications and allergies      PAST MEDICAL HISTORY:  Past Medical History:   Diagnosis Date   • Acne        PAST SURGICAL HISTORY:  History reviewed. No pertinent surgical history.    FAMILY HISTORY:  Family History   Problem Relation Age of Onset   • Hypertension Mother    • Depression Mother    • Breast cancer Mother    • Hypertension Father        SOCIAL HISTORY:  Social History     Tobacco Use   • Smoking status: Never   • Smokeless tobacco: Never   Vaping Use   • Vaping status: Never Used       MEDICATIONS:    Current Outpatient Medications:   •  Diclofenac Sodium (VOLTAREN) 1 %, Apply 2 g topically 4 (four) times a day, Disp: 100 g, Rfl: 2    ALLERGIES:  Allergies   Allergen Reactions   • Sulfa Antibiotics Hives           REVIEW OF SYSTEMS:  Musculoskeletal:        As noted in HPI.   All other systems reviewed and are negative.    VITALS:  Vitals:    05/14/24 0939   BP: 132/92   Pulse: 72       LABS:  HgA1c: No results found for: \"HGBA1C\"  BMP: No results found for: \"GLUCOSE\", \"CALCIUM\", \"NA\", \"K\", \"CO2\", \"CL\", \"BUN\", \"CREATININE\"    _____________________________________________________  PHYSICAL EXAMINATION:  General: Well developed and well nourished, alert & oriented x 3, appears comfortable  Psychiatric: Normal  HEENT: Normocephalic, Atraumatic Trachea Midline, No torticollis  Pulmonary: No audible wheezing or respiratory distress   Abdomen/GI: Non tender, non distended   Cardiovascular: No pitting edema, 2+ radial pulse   Skin: No masses, erythema, " lacerations, fluctation, ulcerations  Neurovascular: Sensation Intact to the Median, Ulnar, Radial Nerve, Motor Intact to the Median, Ulnar, Radial Nerve, and Pulses Intact  Musculoskeletal: Normal, except as noted in detailed exam and in HPI.      MUSCULOSKELETAL EXAMINATION:  Right wrist  Extension 50 diminished compared to contralateral side  Flexion 70  NTTP thumb CMC  NTTP 1st dorsal compartment  TTP radiocarpal joint  TTP lunate   - TFCC grind test  - ECU synergy test   No pain with resisted wrist extension or flexion   8 mm W by 8 mm L by 4 mm H likely cyst dorsal aspect of the wrist  ___________________________________________________  STUDIES REVIEWED:  Xrays of the right hand were reviewed and independently interpreted in PACS by Dr. Badillo and demonstrate no osseous abnormalities.          PROCEDURES PERFORMED:  Procedures  No Procedures performed today    _____________________________________________________      Scribe Attestation    I,:  Priscilla Mares MA am acting as a scribe while in the presence of the attending physician.:       I,:  Aj Badillo MD personally performed the services described in this documentation    as scribed in my presence.:

## 2024-05-15 ENCOUNTER — TELEPHONE (OUTPATIENT)
Age: 44
End: 2024-05-15

## 2024-05-15 NOTE — TELEPHONE ENCOUNTER
PA for DICLO 1% Approved     Date(s) approved UNTIL 05/15/2025    Patient advised by          [] MyChart Message  [] Phone call   [x]LMOM  []L/M to call office as no active Communication consent on file  []Unable to leave detailed message as VM not approved on Communication consent       Pharmacy advised by    [x]Fax  []Phone call    Approval letter scanned into Media No

## 2024-05-15 NOTE — TELEPHONE ENCOUNTER
PA for DICLO 1%    Submitted via    []CMM-KEY   [x][employer]-Case ID # Case ID: [id number]    []Faxed to plan   []Other website   []Phone call Case ID #     Office notes sent, clinical questions answered. Awaiting determination    Turnaround time for your insurance to make a decision on your Prior Authorization can take 7-21 business days.

## 2024-05-25 ENCOUNTER — HOSPITAL ENCOUNTER (OUTPATIENT)
Dept: MRI IMAGING | Facility: HOSPITAL | Age: 44
Discharge: HOME | End: 2024-05-25
Attending: STUDENT IN AN ORGANIZED HEALTH CARE EDUCATION/TRAINING PROGRAM
Payer: COMMERCIAL

## 2024-05-25 DIAGNOSIS — R22.31 MASS OF WRIST, RIGHT: ICD-10-CM

## 2024-05-25 DIAGNOSIS — M25.531 RIGHT WRIST PAIN: ICD-10-CM

## 2024-05-25 PROCEDURE — 73221 MRI JOINT UPR EXTREM W/O DYE: CPT

## 2024-06-04 ENCOUNTER — OFFICE VISIT (OUTPATIENT)
Dept: OBGYN CLINIC | Facility: CLINIC | Age: 44
End: 2024-06-04
Payer: COMMERCIAL

## 2024-06-04 VITALS
BODY MASS INDEX: 25.49 KG/M2 | DIASTOLIC BLOOD PRESSURE: 76 MMHG | HEART RATE: 79 BPM | WEIGHT: 135 LBS | SYSTOLIC BLOOD PRESSURE: 114 MMHG | HEIGHT: 61 IN

## 2024-06-04 DIAGNOSIS — M25.531 RIGHT WRIST PAIN: ICD-10-CM

## 2024-06-04 DIAGNOSIS — M67.40 GANGLION CYST: Primary | ICD-10-CM

## 2024-06-04 PROCEDURE — 20612 ASPIRATE/INJ GANGLION CYST: CPT | Performed by: STUDENT IN AN ORGANIZED HEALTH CARE EDUCATION/TRAINING PROGRAM

## 2024-06-04 PROCEDURE — 99214 OFFICE O/P EST MOD 30 MIN: CPT | Performed by: STUDENT IN AN ORGANIZED HEALTH CARE EDUCATION/TRAINING PROGRAM

## 2024-06-04 RX ORDER — ROPIVACAINE HYDROCHLORIDE 5 MG/ML
1 INJECTION, SOLUTION EPIDURAL; INFILTRATION; PERINEURAL
Status: COMPLETED | OUTPATIENT
Start: 2024-06-04 | End: 2024-06-04

## 2024-06-04 RX ORDER — BETAMETHASONE SODIUM PHOSPHATE AND BETAMETHASONE ACETATE 3; 3 MG/ML; MG/ML
6 INJECTION, SUSPENSION INTRA-ARTICULAR; INTRALESIONAL; INTRAMUSCULAR; SOFT TISSUE
Status: COMPLETED | OUTPATIENT
Start: 2024-06-04 | End: 2024-06-04

## 2024-06-04 RX ADMIN — ROPIVACAINE HYDROCHLORIDE 1 ML: 5 INJECTION, SOLUTION EPIDURAL; INFILTRATION; PERINEURAL at 10:15

## 2024-06-04 RX ADMIN — BETAMETHASONE SODIUM PHOSPHATE AND BETAMETHASONE ACETATE 6 MG: 3; 3 INJECTION, SUSPENSION INTRA-ARTICULAR; INTRALESIONAL; INTRAMUSCULAR; SOFT TISSUE at 10:15

## 2024-06-04 NOTE — PROGRESS NOTES
ORTHOPAEDIC HAND, WRIST, AND ELBOW OFFICE  VISIT      ASSESSMENT/PLAN:      Diagnoses and all orders for this visit:    Ganglion cyst  -     Ambulatory Referral to PT/OT Hand Therapy; Future  -     Hand/upper extremity injection: R wrist  -     ropivacaine (NAROPIN) 0.5 % injection 1 mL  -     betamethasone acetate-betamethasone sodium phosphate (CELESTONE) injection 6 mg    Right wrist pain  -     Ambulatory Referral to PT/OT Hand Therapy; Future          44 y.o. female with right wrist dorsal ganglion cyst    The MRI was reviewed in the office today which demonstrates a dorsal ganglion cyst. Discussed there is no other structural issues or area of pain. I discussed with the patient that the cyst may be causing some pain however, I do not think it is causing all the pain she has been experiencing for the past 20 years. Treatment options were discussed in the form of formal therapy, aspiration, versus surgical excision. The patient elected to proceed with formal therapy and aspiration. A referral was provided to OT. The patient consented and underwent a right wrist dorsal ganglion cyst aspiration and a subsequent steroid injection in the office today and a compression wrap was applied. Discussed if the cyst returns we can consider surgical intervention.       Follow Up:  PRN       To Do Next Visit:         Discussions:  Ganglion Cysts: The anatomy and physiology of the ganglion was discussed with the patient today in the office.  Fluid leaking out of the joint surface typically creates a small sac, which can enlarge and cause pain or limitation of motion.  Treatment options include observation, aspiration, or surgical incision were discussed with the patient today.  Observation typically lead to resolution and approximately 10% of patients, aspiration results in resolution of approximately 50% of patients, and surgical excision leads to resolution in approximately 97% of patients.  After discussion with the patient  today, the patient voiced understanding of all treatment options.       Aj Badillo MD  Attending, Orthopaedic Surgery  Hand, Wrist, and Elbow Surgery  [facility]    ______________________________________________________________________________________________    CHIEF COMPLAINT:  Chief Complaint   Patient presents with   • Right Wrist - Pain, Follow-up     Review MRI        SUBJECTIVE:  Patient is a 44 y.o. RHD female who presents today for follow up of right wrist pain. The patient states her wrist is unchanged since her last visit. She states she has bene back to work and notes pain to the dorsal aspect of her wrist. She also notes intermittent numbness and tingling. At her last visit, a MRI was ordered to evaluate the mass and to evaluate for any other sources of pain.      Occupation: massage therapist       I have personally reviewed all the relevant PMH, PSH, SH, FH, Medications and allergies      PAST MEDICAL HISTORY:  Past Medical History:   Diagnosis Date   • Acne        PAST SURGICAL HISTORY:  History reviewed. No pertinent surgical history.    FAMILY HISTORY:  Family History   Problem Relation Age of Onset   • Hypertension Mother    • Depression Mother    • Breast cancer Mother    • Hypertension Father        SOCIAL HISTORY:  Social History     Tobacco Use   • Smoking status: Never   • Smokeless tobacco: Never   Vaping Use   • Vaping status: Never Used       MEDICATIONS:    Current Outpatient Medications:   •  Diclofenac Sodium (VOLTAREN) 1 %, Apply 2 g topically 4 (four) times a day (Patient not taking: Reported on 6/4/2024), Disp: 100 g, Rfl: 2  No current facility-administered medications for this visit.    ALLERGIES:  Allergies   Allergen Reactions   • Sulfa Antibiotics Hives           REVIEW OF SYSTEMS:  Musculoskeletal:        As noted in HPI.   All other systems reviewed and are negative.    VITALS:  Vitals:    06/04/24 1016   BP: 114/76   Pulse: 79       LABS:  HgA1c:  "No results found for: \"HGBA1C\"  BMP: No results found for: \"GLUCOSE\", \"CALCIUM\", \"NA\", \"K\", \"CO2\", \"CL\", \"BUN\", \"CREATININE\"    _____________________________________________________  PHYSICAL EXAMINATION:  General: Well developed and well nourished, alert & oriented x 3, appears comfortable  Psychiatric: Normal  HEENT: Normocephalic, Atraumatic Trachea Midline, No torticollis  Pulmonary: No audible wheezing or respiratory distress   Abdomen/GI: Non tender, non distended   Cardiovascular: No pitting edema, 2+ radial pulse   Skin: No Erythema, No Lacerations, No Fluctuation, No Ulcerations  Neurovascular: Sensation Intact to the Median, Ulnar, Radial Nerve, Motor Intact to the Median, Ulnar, Radial Nerve, and Pulses Intact  Musculoskeletal: Normal, except as noted in detailed exam and in HPI.      MUSCULOSKELETAL EXAMINATION:  Right wrist  No pain with resisted wrist extension or flexion   8 mm W by 8 mm L by 4 mm H likely cyst dorsal aspect of the wrist  ___________________________________________________  STUDIES REVIEWED:  MRI of the right wrist was reviewed and independently interpreted in PACS by Dr. Badillo and demonstrates dorsal ganglion cyst.          PROCEDURES PERFORMED:  Hand/upper extremity injection: R wrist  Universal Protocol:  Consent: Verbal consent obtained.  Consent given by: patient  Patient identity confirmed: verbally with patient  Supporting Documentation  Indications: pain   Procedure Details  Condition:dorsal carpal ganglion Site: R wrist   Preparation: Patient was prepped and draped in the usual sterile fashion  Needle size: 25 G  Ultrasound guidance: no  Medications administered: 6 mg betamethasone acetate-betamethasone sodium phosphate 6 (3-3) mg/mL; 1 mL ropivacaine 0.5 %  Patient tolerance: patient tolerated the procedure well with no immediate complications  Dressing:  Sterile dressing applied              _____________________________________________________      Scribe Attestation  "   I,:  Priscilla Mares MA am acting as a scribe while in the presence of the attending physician.:       I,:  Aj Badillo MD personally performed the services described in this documentation    as scribed in my presence.:

## 2024-06-12 ENCOUNTER — EVALUATION (OUTPATIENT)
Dept: OCCUPATIONAL THERAPY | Facility: CLINIC | Age: 44
End: 2024-06-12
Payer: COMMERCIAL

## 2024-06-12 DIAGNOSIS — M67.40 GANGLION CYST: ICD-10-CM

## 2024-06-12 DIAGNOSIS — M25.531 RIGHT WRIST PAIN: ICD-10-CM

## 2024-06-12 DIAGNOSIS — Z98.890 STATUS POST SURGERY: Primary | ICD-10-CM

## 2024-06-12 PROCEDURE — 97165 OT EVAL LOW COMPLEX 30 MIN: CPT

## 2024-06-12 NOTE — PROGRESS NOTES
OT Evaluation     Today's date: 2024  Patient name: Annemarie Carranza  : 1980  MRN: [id number]  Referring provider: Aj Badillo MD  Dx:   Encounter Diagnosis     ICD-10-CM    1. Status post surgery  Z98.890                      Assessment  Impairments: abnormal or restricted ROM, activity intolerance, impaired physical strength, pain with function and weight-bearing intolerance  Symptom irritability: low    Assessment details: Patient presents to OP OT hand therapy services due to a diagnosis of a dorsal ganglion cyst associated with the dorsal scapholunate ligament. Patient symptoms have been present for roughly 20 years. Patient had initial appointment with orthopedics on 24. Patient had an MRI on 24 that confirmed the ganglion cyst. Patient cyst was aspirated and they received a CSI injection by orthopedics on 24. Patient reports mild pain in dorsal wrist. Point tenderness located over the S/L interval as anticipated. Wrist ROM is slightly decreased as compared to the contralateral side. Digit AROM is WNL. /pinch strength was found to be decreased as compared to the contralateral side. Patient was provided a custom HEP and instructed on how to complete it.     Goals  STG:    Patient will increase wrist AROM in all planes by >10 degrees in 4 weeks    Patient will report overall decreased pain by 1-2 grades in 4 weeks    Patient will increase  strength by >10 lbs in 4 weeks    Patient will increase lateral pinch by >3 lbs in 4 weeks    LTG:    Patient will display wrist AROM WNL as compared to the contralateral side in 8 weeks or discharge    Patient will report resolution of pain in the dorsal wrist in 8 weeks or discharge    Patient will display  strength WNL compared to the contralateral side in 8 weeks or discharge    Patient will display pinch strength WNL compared to the contralateral side in 8 weeks or discharge    Patient will report the ability to complete  "weightbearing activities such as push ups without pain in 8 weeks or discharge      Plan  Patient would benefit from: OT eval and custom splinting  Referral necessary: No  Planned modality interventions: TENS, thermotherapy: hydrocollator packs and ultrasound    Planned therapy interventions: compression, functional ROM exercises, fine motor coordination training, graded activity, graded exercise, home exercise program, IASTM, joint mobilization, kinesiology taping, manual therapy, Gordillo taping, nerve gliding, orthotic management and training, orthotic fitting/training, patient education, strengthening, stretching, therapeutic activities and therapeutic exercise    Frequency: 2x week  Duration in weeks: 10  Plan of Care beginning date: 2024  Plan of Care expiration date: 2024  Plan details: Patient would benefit from skilled OP OT hand therapy services 2x a week for 8 weeks to decrease pain, increase strength, and return to full functional status.         Subjective Evaluation    History of Present Illness  Date of surgery: 2024  Mechanism of injury: 24: Aspiration of ganglion cyst and CSI injection    Subjective:  \"I've had issues for over 15 years and I saw a orthopedic specialist but the cyst wasn't found then\". \"I couldn't bear weight on the hand and wrist without being in a fist\". \"There was a a second cyst in the ligament that was also causing limitations\". \"I got a neuro-stim machine that I have been using\". \"The mobility is a lot better as compared to last week\". \"It goes up to a 3 when I stretch\". \"I would really like to get back to weightbearing\".           Recurrent probem    Quality of life: good    Patient Goals  Patient goals for therapy: decreased pain, increased motion, increased strength, return to work, return to sport/leisure activities and independence with ADLs/IADLs    Pain  Current pain ratin  At best pain ratin  At worst pain rating: 3  Location: R dorsal " wrist  Quality: tight  Relieving factors: rest and relaxation  Aggravating factors: lifting (Weightbearing, general PROM of the wrist)  Progression: improved    Social Support    Employment status: working (Massage Therapist)  Hand dominance: right      Diagnostic Tests  X-ray: normal  MRI studies: abnormal  Treatments  Previous treatment: injection treatment  Current treatment: occupational therapy        Objective     Observations     Right Wrist/Hand   Positive for edema.     Tenderness     Right Wrist/Hand   Tenderness in the scaphoid and lunate.     Neurological Testing     Sensation     Wrist/Hand   Left   Intact: light touch    Right   Intact: light touch    Active Range of Motion     Left Wrist   Wrist flexion: 75 degrees   Wrist extension: 75 degrees   Radial deviation: 25 degrees   Ulnar deviation: 25 degrees     Right Wrist   Wrist flexion: 68 degrees   Wrist extension: 65 degrees   Radial deviation: 15 degrees   Ulnar deviation: 25 degrees     Left Thumb   Kapandji score: 10 degrees      Right Thumb   Kapandji score: 10 degrees    Additional Active Range of Motion Details  B/L Full composite fist    Strength/Myotome Testing     Left Wrist/Hand   Normal wrist strength     (2nd hand position)     Trial 1: 53.3    Thumb Strength  Key/Lateral Pinch     Trial 1: 13  Tip/Two-Point Pinch     Trial 1: 10  Palmar/Three-Point Pinch     Trial 1: 17    Right Wrist/Hand   Wrist extension: 5  Wrist flexion: 5     (2nd hand position)     Trial 1: 44.7    Thumb Strength   Key/Lateral Pinch     Trial 1: 14  Tip/Two-Point Pinch     Trial 1: 10  Palmar/Three-Point Pinch     Trial 1: 16    Swelling     Left Wrist/Hand   Circumference wrist: 14.2 cm    Right Wrist/Hand   Circumference wrist: 14.5 cm             Precautions: Universal      Manuals 6/12            STM                                                    Neuro Re-Ed                                                                                                         Ther Ex             HEP Wrist AROM    Dart throw    Tendon glide            Wrist AROM on Wedge             Dart throw             Wrist Maze             TB on wall             Digiflex             Hand Power Pro             Ext web                                                    Ther Activity             Keypegs             Colored pegs                                                    Modalities             [facility]